# Patient Record
Sex: MALE | Race: OTHER | Employment: FULL TIME | URBAN - METROPOLITAN AREA
[De-identification: names, ages, dates, MRNs, and addresses within clinical notes are randomized per-mention and may not be internally consistent; named-entity substitution may affect disease eponyms.]

---

## 2022-04-20 ENCOUNTER — HOSPITAL ENCOUNTER (EMERGENCY)
Age: 37
Discharge: HOME | End: 2022-04-20
Attending: STUDENT IN AN ORGANIZED HEALTH CARE EDUCATION/TRAINING PROGRAM
Payer: COMMERCIAL

## 2022-04-20 VITALS
TEMPERATURE: 97.8 F | WEIGHT: 230 LBS | RESPIRATION RATE: 14 BRPM | HEART RATE: 95 BPM | DIASTOLIC BLOOD PRESSURE: 73 MMHG | HEIGHT: 67 IN | OXYGEN SATURATION: 98 % | SYSTOLIC BLOOD PRESSURE: 137 MMHG | BODY MASS INDEX: 36.1 KG/M2

## 2022-04-20 DIAGNOSIS — M54.50 ACUTE RIGHT-SIDED LOW BACK PAIN WITHOUT SCIATICA: Primary | ICD-10-CM

## 2022-04-20 PROCEDURE — 6360000002 HC RX W HCPCS: Performed by: STUDENT IN AN ORGANIZED HEALTH CARE EDUCATION/TRAINING PROGRAM

## 2022-04-20 PROCEDURE — 96372 THER/PROPH/DIAG INJ SC/IM: CPT

## 2022-04-20 PROCEDURE — 99284 EMERGENCY DEPT VISIT MOD MDM: CPT

## 2022-04-20 RX ORDER — ORPHENADRINE CITRATE 30 MG/ML
60 INJECTION INTRAMUSCULAR; INTRAVENOUS ONCE
Status: COMPLETED | OUTPATIENT
Start: 2022-04-20 | End: 2022-04-20

## 2022-04-20 RX ORDER — KETOROLAC TROMETHAMINE 10 MG/1
10 TABLET, FILM COATED ORAL EVERY 6 HOURS PRN
Qty: 20 TABLET | Refills: 0 | Status: SHIPPED | OUTPATIENT
Start: 2022-04-20 | End: 2022-04-25

## 2022-04-20 RX ORDER — CYCLOBENZAPRINE HCL 5 MG
5 TABLET ORAL 2 TIMES DAILY PRN
Qty: 10 TABLET | Refills: 0 | Status: SHIPPED | OUTPATIENT
Start: 2022-04-20 | End: 2022-04-30

## 2022-04-20 RX ORDER — KETOROLAC TROMETHAMINE 30 MG/ML
30 INJECTION, SOLUTION INTRAMUSCULAR; INTRAVENOUS ONCE
Status: COMPLETED | OUTPATIENT
Start: 2022-04-20 | End: 2022-04-20

## 2022-04-20 RX ORDER — DEXAMETHASONE SODIUM PHOSPHATE 10 MG/ML
8 INJECTION INTRAMUSCULAR; INTRAVENOUS ONCE
Status: COMPLETED | OUTPATIENT
Start: 2022-04-20 | End: 2022-04-20

## 2022-04-20 RX ADMIN — DEXAMETHASONE SODIUM PHOSPHATE 8 MG: 10 INJECTION INTRAMUSCULAR; INTRAVENOUS at 13:11

## 2022-04-20 RX ADMIN — ORPHENADRINE CITRATE 60 MG: 30 INJECTION INTRAMUSCULAR; INTRAVENOUS at 13:12

## 2022-04-20 RX ADMIN — KETOROLAC TROMETHAMINE 30 MG: 30 INJECTION, SOLUTION INTRAMUSCULAR at 13:11

## 2022-04-20 ASSESSMENT — PAIN DESCRIPTION - LOCATION: LOCATION: FLANK

## 2022-04-20 ASSESSMENT — PAIN SCALES - GENERAL: PAINLEVEL_OUTOF10: 7

## 2022-04-20 NOTE — Clinical Note
Oscar Christianson was seen and treated in our emergency department on 4/20/2022. He may return to work on 04/22/2022. If you have any questions or concerns, please don't hesitate to call.       Justus Mckinley, DO

## 2022-04-20 NOTE — ED PROVIDER NOTES
Department of Emergency Medicine   ED  Provider Note  Admit Date/RoomTime: 4/20/2022 12:29 PM  ED Room: [facility]          History of Present Illness:  4/20/22, Time: 1:01 PM EDT  Chief Complaint   Patient presents with    Flank Pain     R side onset yesterday after bending over to  something while working. Brenden Vargas is a 29 y.o. male presenting to the ED for right sided flank pain, beginning yesterday. The complaint has been persistent, mild in severity, and worsened by movement. The patient is a 79-year-old male who presents to the emergency department complaining of right-sided flank pain. Patient symptoms were sudden onset yesterday, has been persistent, mild in severity, nothing makes it better. Is worse with certain movements. He states that he does a lot of lifting at work and thinks that he may have pulled something. He does have pain over his right back. Triage and chief complaint and states flank pain, but when he points over his pain it is more so in his thoracic region of his back. He describes the pain as aching and nonradiating. He states it has progressively worsened. He tried Tylenol ibuprofen at home without any relief. The patient is [place]-speaking and the  was used. He denies any fever, chills, dysuria, hematuria, saddle anesthesia, numbness or tingling in his legs, chest pain, shortness of breath, abdominal pain, nausea, vomiting, diarrhea, fall, history of prior back surgeries, recent hospitalization, recent illness, cough, congestion, or other acute symptoms or concerns. Review of Systems:   A complete review of systems was performed and pertinent positives and negatives are stated within HPI, all other systems reviewed and are negative.        --------------------------------------------- PAST HISTORY ---------------------------------------------  Past Medical History:  has no past medical history on file.     Past Surgical History:  has no past surgical history on file. Social History:      Family History: family history is not on file. . Unless otherwise noted, family history is non contributory    The patients home medications have been reviewed. Allergies: Patient has no known allergies. I have reviewed the past medical history, past surgical history, social history, and family history    ---------------------------------------------------PHYSICAL EXAM--------------------------------------    Constitutional/General: Alert and oriented x3, sitting up in no acute distress. Head: Normocephalic and atraumatic  Eyes:  EOMI, sclera non icteric  ENT: Oropharynx clear, handling secretions, no trismus, no asymmetry of the posterior oropharynx or uvular edema  Neck: Supple, full ROM, no stridor, no meningeal signs  Respiratory: Lungs clear to auscultation bilaterally, no wheezes, rales, or rhonchi. Not in respiratory distress  Cardiovascular:  Regular rate. Regular rhythm. No murmurs, no gallops, no rubs. 2+ distal pulses. Equal extremity pulses. Gastrointestinal: Mild tenderness palpation over the right thoracic paraspinal musculature. There is no tenderness to the cervical, thoracic, or lumbar spinous process. There are no step-offs or deformities. No tenderness over the right CVA. Abdomen itself is soft and nontender and there is no rigidity or rebound tenderness or guarding. No right lower quadrant tenderness. Musculoskeletal: Moves all extremities x 4. Warm and well perfused, no clubbing, no cyanosis, no edema. Capillary refill <3 seconds  Skin: skin warm and dry. No rashes. Neurologic: GCS 15, no focal deficits, symmetric strength 5/5 in the upper and lower extremities bilaterally  Psychiatric: Normal Affect      -------------------------------------------------- RESULTS -------------------------------------------------  I have personally reviewed all laboratory and imaging results for this patient. Results are listed below. LABS: (Lab results interpreted by me)  No results found for this visit on 04/20/22.,       RADIOLOGY:  Interpreted by Radiologist unless otherwise specified  No orders to display           ------------------------- NURSING NOTES AND VITALS REVIEWED ---------------------------   The nursing notes within the ED encounter and vital signs as below have been reviewed by myself  /73   Pulse 95   Temp 97.8 °F (36.6 °C) (Temporal)   Resp 14   Ht 5' 7\" (1.702 m)   Wt 230 lb (104.3 kg)   SpO2 98%   BMI 36.02 kg/m²     Oxygen Saturation Interpretation: Normal    The patients available past medical records and past encounters were reviewed. ------------------------------ ED COURSE/MEDICAL DECISION MAKING----------------------  Medications   orphenadrine (NORFLEX) injection 60 mg (60 mg IntraMUSCular Given 4/20/22 1312)   dexamethasone (DECADRON) injection 8 mg (8 mg IntraMUSCular Given 4/20/22 1311)   ketorolac (TORADOL) injection 30 mg (30 mg IntraMUSCular Given 4/20/22 1311)           The cardiac monitor revealed NSR with a heart rate in the 90s as interpreted by me. The cardiac monitor was ordered secondary to the patient's pain and to monitor the patient for dysrhythmia. CPT [id number]       I, Dr. Baron Park, am the primary provider of record    Medical Decision Making:   The patient is a 41-year-old male who presents the emergency department complaining of flank pain. The patient is hemodynamically stable, nontoxic, and in no acute distress. He does have reproducible tenderness palpation over the thoracic paraspinal musculature. There is no tenderness over the CVA no abdominal tenderness present on examination. No red flag back pain signs or symptoms. No neurological deficits. No evidence of trauma. He was treated symptomatically. I suspect his symptoms are muscular in etiology. Strict return precautions given and close follow-up recommended.   Patient verbalized understanding and agreement to treatment plan discharge instructions. Oxygen Saturation Interpretation: 98 % on room air. Re-Evaluations:       Felt better on reassessment. This patient's ED course included: a personal history and examination and reassessment. This patient has remained hemodynamically stable during their ED course. Counseling: The emergency provider has spoken with the patient and discussed todays results, in addition to providing specific details for the plan of care and counseling regarding the diagnosis and prognosis. Questions are answered at this time and they are agreeable with the plan.       --------------------------------- IMPRESSION AND DISPOSITION ---------------------------------    IMPRESSION  1. Acute right-sided low back pain without sciatica        DISPOSITION  Disposition: Discharge to home  Patient condition is stable        NOTE: This report was transcribed using voice recognition software.  Every effort was made to ensure accuracy; however, inadvertent computerized transcription errors may be present       Romina Honeycutt DO  04/21/22 [id number]